# Patient Record
Sex: FEMALE | Race: BLACK OR AFRICAN AMERICAN | NOT HISPANIC OR LATINO | Employment: FULL TIME | URBAN - METROPOLITAN AREA
[De-identification: names, ages, dates, MRNs, and addresses within clinical notes are randomized per-mention and may not be internally consistent; named-entity substitution may affect disease eponyms.]

---

## 2022-03-23 ENCOUNTER — HISTORICAL (OUTPATIENT)
Dept: CARDIOLOGY | Facility: HOSPITAL | Age: 27
End: 2022-03-23

## 2022-03-24 ENCOUNTER — HISTORICAL (OUTPATIENT)
Dept: CARDIOLOGY | Facility: CLINIC | Age: 27
End: 2022-03-24

## 2023-01-31 ENCOUNTER — OFFICE VISIT (OUTPATIENT)
Dept: URGENT CARE | Facility: CLINIC | Age: 28
End: 2023-01-31
Payer: MEDICAID

## 2023-01-31 VITALS
RESPIRATION RATE: 20 BRPM | HEIGHT: 66 IN | HEART RATE: 77 BPM | WEIGHT: 228 LBS | TEMPERATURE: 98 F | BODY MASS INDEX: 36.64 KG/M2 | DIASTOLIC BLOOD PRESSURE: 79 MMHG | OXYGEN SATURATION: 96 % | SYSTOLIC BLOOD PRESSURE: 113 MMHG

## 2023-01-31 DIAGNOSIS — R68.89 FLU-LIKE SYMPTOMS: Primary | ICD-10-CM

## 2023-01-31 DIAGNOSIS — Z11.52 ENCOUNTER FOR SCREENING FOR SEVERE ACUTE RESPIRATORY SYNDROME CORONAVIRUS 2 (SARS-COV-2) INFECTION: ICD-10-CM

## 2023-01-31 PROBLEM — E66.9 OBESITY: Status: ACTIVE | Noted: 2023-01-31

## 2023-01-31 LAB
CTP QC/QA: YES
CTP QC/QA: YES
FLUAV AG UPPER RESP QL IA.RAPID: NOT DETECTED
FLUBV AG UPPER RESP QL IA.RAPID: NOT DETECTED
POC MOLECULAR INFLUENZA A AGN: NEGATIVE
POC MOLECULAR INFLUENZA B AGN: NEGATIVE
RSV A 5' UTR RNA NPH QL NAA+PROBE: NOT DETECTED
SARS-COV-2 RDRP RESP QL NAA+PROBE: NEGATIVE
SARS-COV-2 RNA RESP QL NAA+PROBE: NOT DETECTED

## 2023-01-31 PROCEDURE — 99213 PR OFFICE/OUTPT VISIT, EST, LEVL III, 20-29 MIN: ICD-10-PCS | Mod: S$PBB,,, | Performed by: NURSE PRACTITIONER

## 2023-01-31 PROCEDURE — 99213 OFFICE O/P EST LOW 20 MIN: CPT | Mod: S$PBB,,, | Performed by: NURSE PRACTITIONER

## 2023-01-31 PROCEDURE — 99214 OFFICE O/P EST MOD 30 MIN: CPT | Mod: PBBFAC | Performed by: NURSE PRACTITIONER

## 2023-01-31 PROCEDURE — 0241U COVID/RSV/FLU A&B PCR: CPT | Performed by: NURSE PRACTITIONER

## 2023-01-31 PROCEDURE — 87502 INFLUENZA DNA AMP PROBE: CPT | Mod: PBBFAC | Performed by: NURSE PRACTITIONER

## 2023-01-31 PROCEDURE — 87635 SARS-COV-2 COVID-19 AMP PRB: CPT | Mod: PBBFAC | Performed by: NURSE PRACTITIONER

## 2023-01-31 RX ORDER — ALBUTEROL SULFATE 90 UG/1
2 AEROSOL, METERED RESPIRATORY (INHALATION) EVERY 6 HOURS PRN
Qty: 18 G | Refills: 0 | Status: SHIPPED | OUTPATIENT
Start: 2023-01-31 | End: 2023-06-26

## 2023-01-31 RX ORDER — PROMETHAZINE HYDROCHLORIDE AND DEXTROMETHORPHAN HYDROBROMIDE 6.25; 15 MG/5ML; MG/5ML
5 SYRUP ORAL EVERY 6 HOURS PRN
Qty: 100 ML | Refills: 0 | Status: SHIPPED | OUTPATIENT
Start: 2023-01-31 | End: 2023-02-05

## 2023-01-31 NOTE — PROGRESS NOTES
"Subjective:       Patient ID: Richy Martinez is a 27 y.o. female.    Vitals:  height is 5' 6" (1.676 m) and weight is 103.4 kg (228 lb). Her oral temperature is 97.8 °F (36.6 °C). Her blood pressure is 113/79 and her pulse is 77. Her respiration is 20 and oxygen saturation is 96%.     Chief Complaint: Cough, Nasal Congestion, Shortness of Breath, and Generalized Body Aches (X 3 days)    HPI as stated in CC. Works at [employer], high exposure. NO hx asthma. Excess coughing, chest tightness, painful cough. No fevers.   ROS    Objective:      Physical Exam   Constitutional: She is oriented to person, place, and time.  Non-toxic appearance. She appears ill. No distress. normal  HENT:   Nose: Rhinorrhea and congestion present.   Eyes: Conjunctivae are normal.   Cardiovascular: Normal rate, regular rhythm, normal heart sounds and normal pulses.   Pulmonary/Chest: Effort normal. No respiratory distress. She has wheezes. She has rhonchi. She has rales. She exhibits tenderness.   Neurological: She is alert and oriented to person, place, and time.   Skin: Skin is warm, dry and not diaphoretic.   Psychiatric: Her behavior is normal. Mood, judgment and thought content normal.   Vitals reviewed.      Assessment:       1. Flu-like symptoms    2. Encounter for screening for severe acute respiratory syndrome coronavirus 2 (SARS-CoV-2) infection          Results for orders placed or performed in visit on 01/31/23   POCT COVID-19 Rapid Screening   Result Value Ref Range    POC Rapid COVID Negative Negative     Acceptable Yes    POCT Influenza A/B MOLECULAR   Result Value Ref Range    POC Molecular Influenza A Ag Negative Negative, Not Reported    POC Molecular Influenza B Ag Negative Negative, Not Reported     Acceptable Yes          Plan:         Flu-like symptoms  -     POCT Influenza A/B MOLECULAR  -     COVID/RSV/FLU A&B PCR    Encounter for screening for severe acute respiratory syndrome " coronavirus 2 (SARS-CoV-2) infection  -     POCT COVID-19 Rapid Screening  -     COVID/RSV/FLU A&B PCR    Other orders  -     albuterol (VENTOLIN HFA) 90 mcg/actuation inhaler; Inhale 2 puffs into the lungs every 6 (six) hours as needed for Wheezing or Shortness of Breath.  Dispense: 18 g; Refill: 0  -     promethazine-dextromethorphan (PROMETHAZINE-DM) 6.25-15 mg/5 mL Syrp; Take 5 mLs by mouth every 6 (six) hours as needed (severe cough; reserve for evening/night use, will cause drowsiness. do not take before driving, working, drinking alcohol.).  Dispense: 100 mL; Refill: 0         Please read attached patient education for more information and guidance.     You have 3 tests pending in the hospital lab:  PCR COVID, FLU A/B, RSV    All results will be available today.  Results will post to your [facility] over the next 1-2 days. Please check  your luis felipe frequently for results and messages.   Nurse calls from our clinic can take 1-2 weeks even with abnormal results.     Stay home as long as you are symptomatic.    You can BECOME COVID+ up to 14 days after your last exposure to someone who has COVID. If you go in public and around other people, wear a surgical mask, not a cloth.     Cover your mouth with your shirt or inner elbow when you cough or sneeze.    OTC meds for symptoms, pain, fever, as desired, as directed on package labeling.   **If you are COVID NEGATIVE on your PCR test in lab, we need to Rx you a course of steroids.**

## 2023-01-31 NOTE — PATIENT INSTRUCTIONS
Please read attached patient education for more information and guidance.     You have 3 tests pending in the hospital lab:  PCR COVID, FLU A/B, RSV    All results will be available today.  Results will post to your [facility] over the next 1-2 days. Please check  your luis felipe frequently for results and messages.   Nurse calls from our clinic can take 1-2 weeks even with abnormal results.     Stay home as long as you are symptomatic.    You can BECOME COVID+ up to 14 days after your last exposure to someone who has COVID. If you go in public and around other people, wear a surgical mask, not a cloth.     Cover your mouth with your shirt or inner elbow when you cough or sneeze.    OTC meds for symptoms, pain, fever, as desired, as directed on package labeling.   **If you are COVID NEGATIVE on your PCR test in lab, we need to Rx you a course of steroids.**

## 2023-02-01 ENCOUNTER — TELEPHONE (OUTPATIENT)
Dept: URGENT CARE | Facility: CLINIC | Age: 28
End: 2023-02-01
Payer: MEDICAID

## 2023-06-26 ENCOUNTER — OFFICE VISIT (OUTPATIENT)
Dept: INTERNAL MEDICINE | Facility: CLINIC | Age: 28
End: 2023-06-26
Payer: MEDICAID

## 2023-06-26 VITALS
HEART RATE: 73 BPM | RESPIRATION RATE: 20 BRPM | WEIGHT: 230.19 LBS | BODY MASS INDEX: 36.99 KG/M2 | HEIGHT: 66 IN | DIASTOLIC BLOOD PRESSURE: 83 MMHG | TEMPERATURE: 98 F | SYSTOLIC BLOOD PRESSURE: 117 MMHG

## 2023-06-26 DIAGNOSIS — Z00.00 WELLNESS EXAMINATION: Primary | ICD-10-CM

## 2023-06-26 DIAGNOSIS — Z13.1 SCREENING FOR DIABETES MELLITUS: ICD-10-CM

## 2023-06-26 DIAGNOSIS — Z11.3 ROUTINE SCREENING FOR STI (SEXUALLY TRANSMITTED INFECTION): ICD-10-CM

## 2023-06-26 PROCEDURE — 3074F SYST BP LT 130 MM HG: CPT | Mod: CPTII,,, | Performed by: NURSE PRACTITIONER

## 2023-06-26 PROCEDURE — 1160F PR REVIEW ALL MEDS BY PRESCRIBER/CLIN PHARMACIST DOCUMENTED: ICD-10-PCS | Mod: CPTII,,, | Performed by: NURSE PRACTITIONER

## 2023-06-26 PROCEDURE — 99214 OFFICE O/P EST MOD 30 MIN: CPT | Mod: PBBFAC | Performed by: NURSE PRACTITIONER

## 2023-06-26 PROCEDURE — 1159F PR MEDICATION LIST DOCUMENTED IN MEDICAL RECORD: ICD-10-PCS | Mod: CPTII,,, | Performed by: NURSE PRACTITIONER

## 2023-06-26 PROCEDURE — 99395 PR PREVENTIVE VISIT,EST,18-39: ICD-10-PCS | Mod: S$PBB,,, | Performed by: NURSE PRACTITIONER

## 2023-06-26 PROCEDURE — 99395 PREV VISIT EST AGE 18-39: CPT | Mod: S$PBB,,, | Performed by: NURSE PRACTITIONER

## 2023-06-26 PROCEDURE — 1159F MED LIST DOCD IN RCRD: CPT | Mod: CPTII,,, | Performed by: NURSE PRACTITIONER

## 2023-06-26 PROCEDURE — 3079F PR MOST RECENT DIASTOLIC BLOOD PRESSURE 80-89 MM HG: ICD-10-PCS | Mod: CPTII,,, | Performed by: NURSE PRACTITIONER

## 2023-06-26 PROCEDURE — 1160F RVW MEDS BY RX/DR IN RCRD: CPT | Mod: CPTII,,, | Performed by: NURSE PRACTITIONER

## 2023-06-26 PROCEDURE — 3079F DIAST BP 80-89 MM HG: CPT | Mod: CPTII,,, | Performed by: NURSE PRACTITIONER

## 2023-06-26 PROCEDURE — 3008F BODY MASS INDEX DOCD: CPT | Mod: CPTII,,, | Performed by: NURSE PRACTITIONER

## 2023-06-26 PROCEDURE — 3074F PR MOST RECENT SYSTOLIC BLOOD PRESSURE < 130 MM HG: ICD-10-PCS | Mod: CPTII,,, | Performed by: NURSE PRACTITIONER

## 2023-06-26 PROCEDURE — 3008F PR BODY MASS INDEX (BMI) DOCUMENTED: ICD-10-PCS | Mod: CPTII,,, | Performed by: NURSE PRACTITIONER

## 2023-06-26 NOTE — ASSESSMENT & PLAN NOTE
Enc routine eye and dental exams  RTC 6-8 weeks w/ labs  Will refer to GYN, but patient will look around to get pap done this year and f/u with [facility] for Nexplanon removal this year

## 2023-06-26 NOTE — PROGRESS NOTES
ADRIENNE Melchor   [facility]  [address] [address]      PATIENT NAME: Richy Martinez  : 1995  DATE: 23  MRN: [id number]        Reason for Visit / Chief Complaint: Establish Care       History of Present Illness / Problem Focused Workflow     Richy Martinez presents to the clinic with Establish Care     23: 27 y.o. BF presenting to [facility] to establish primary care.     Previous PCP: None. Moved back from [place] about 5 years ago and did not establish care until now  PmHx: BMI 37, s/p Nexplanon insertion 2.5 years ago  SHx:  x 3, tooth extraction  FHx: Cancer in her maternal grandmother; Epilepsy in her mother; Hypertension in her father; Thyroid cancer in her maternal grandmother.  Complaints today: Establish care    Rcihy is presenting to establish care. Simply wants a check-up. No specific complaints today. She relates living in [place] in the past, but moved back home 5 years ago. Did not establish care until now.     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     She cannot recall last pap. She uses the Nexplanon implant for birth control. This was inserted via the [facility] about 2.5 years ago. Scheduled to be removed 2023.     She does not take any routine medications otherwise.    She has braces and follows an orthodontist.    Otherwise, no acute concerns.       Review of Systems     Review of Systems   Constitutional: Negative.  Negative for activity change, appetite change, chills, diaphoresis, fatigue, fever and unexpected weight change.   HENT: Negative.     Eyes: Negative.  Negative for photophobia, pain, discharge, redness, itching and visual disturbance.   Respiratory: Negative.  Negative for apnea, cough, choking, chest tightness, shortness of breath, wheezing and stridor.    Cardiovascular: Negative.  Negative for chest pain, palpitations and leg  "swelling.   Gastrointestinal: Negative.    Endocrine: Negative.  Negative for cold intolerance, heat intolerance, polydipsia and polyphagia.   Genitourinary: Negative.  Negative for difficulty urinating and dysuria.   Musculoskeletal: Negative.  Negative for arthralgias and back pain.   Skin: Negative.    Allergic/Immunologic: Negative.    Neurological: Negative.    Hematological: Negative.    Psychiatric/Behavioral: Negative.  Negative for agitation, behavioral problems, self-injury, sleep disturbance and suicidal ideas. The patient is not nervous/anxious.      Medical / Social / Family History   History reviewed. No pertinent past medical history.    Past Surgical History:   Procedure Laterality Date     SECTION  /    EXTRACTION OF TOOTH         Social History  Ms.  reports that she has never smoked. She has been exposed to tobacco smoke. She has never used smokeless tobacco. She reports that she does not currently use alcohol. She reports that she does not use drugs.    Family History  Ms.'s family history includes Cancer in her maternal grandmother; Epilepsy in her mother; Hypertension in her father; Thyroid cancer in her maternal grandmother.    Medications and Allergies     Medications  Current Outpatient Medications   Medication Instructions    etonogestreL (NEXPLANON) 68 mg, Subdermal, Once, A single NEXPLANON implant is inserted subdermally just under the skin at the inner side of the non-dominant upper arm.       Allergies  Review of patient's allergies indicates:  No Known Allergies    Physical Examination   Visit Vitals  /83 (BP Location: Left arm, Patient Position: Sitting, BP Method: Medium (Automatic))   Pulse 73   Temp 98.1 °F (36.7 °C) (Oral)   Resp 20   Ht 5' 6" (1.676 m)   Wt 104.4 kg (230 lb 3.2 oz)   BMI 37.16 kg/m²     Physical Exam  Constitutional:       Appearance: Normal appearance. She is obese.   HENT:      Head: Normocephalic and atraumatic.      Right Ear: " Tympanic membrane, ear canal and external ear normal.      Left Ear: Tympanic membrane, ear canal and external ear normal.      Nose: Nose normal.      Mouth/Throat:      Mouth: Mucous membranes are moist.      Pharynx: Oropharynx is clear.      Comments:   +braces  Eyes:      Extraocular Movements: Extraocular movements intact.      Conjunctiva/sclera: Conjunctivae normal.      Pupils: Pupils are equal, round, and reactive to light.   Neck:      Vascular: No carotid bruit.   Cardiovascular:      Rate and Rhythm: Normal rate and regular rhythm.      Pulses: Normal pulses.      Heart sounds: Normal heart sounds.   Pulmonary:      Effort: Pulmonary effort is normal.      Breath sounds: Normal breath sounds.   Abdominal:      General: Bowel sounds are normal.      Palpations: Abdomen is soft.   Musculoskeletal:         General: Normal range of motion.      Cervical back: Normal range of motion.      Right lower leg: No edema.      Left lower leg: No edema.   Skin:     General: Skin is warm and dry.   Neurological:      General: No focal deficit present.      Mental Status: She is alert and oriented to person, place, and time.   Psychiatric:         Mood and Affect: Mood normal.         Behavior: Behavior normal.         Thought Content: Thought content normal.         Judgment: Judgment normal.         Results     Chemistry:  No results found for: NA, K, CHLORIDE, BUN, CREATININE, EGFRNORACEVR, GLUCOSE, CALCIUM, ALKPHOS, LABPROT, ALBUMIN, BILIDIR, IBILI, AST, ALT, MG, PHOS, [id number]     No results found for: HGBA1C, MICROALBCREA     Hematology:  No results found for: WBC, HGB, HCT, PLT    Lipid Panel:  No results found for: CHOL, HDL, LDL, TRIG, TOTALCHOLEST     Urine:  No results found for: COLORUA, APPEARANCEUA, SGUA, PHUA, PROTEINUA, GLUCOSEUA, KETONESUA, BLOODUA, NITRITESUA, LEUKOCYTESUR, RBCUA, WBCUA, BACTERIA, SQEPUA, HYALINECASTS, CREATRANDUR, PROTEINURINE, UPROTCREA       Assessment        ICD-10-CM  ICD-9-CM   1. Wellness examination  Z00.00 V70.0   2. Routine screening for STI (sexually transmitted infection)  Z11.3 V74.5   3. Screening for diabetes mellitus  Z13.1 V77.1   4. BMI 37.0-37.9, adult  Z68.37 V85.37        Plan (including Health Maintenance)     Problem List Items Addressed This Visit          Endocrine    BMI 37.0-37.9, adult    Current Assessment & Plan     Educated on aerobic exercise for 30 mins/day, at least 5 days per week. Low-fat diet              Other    Wellness examination - Primary    Current Assessment & Plan     Enc routine eye and dental exams  RTC 6-8 weeks w/ labs  Will refer to GYN, but patient will look around to get pap done this year and f/u with [facility] for Nexplanon removal this year         Relevant Orders    Ambulatory referral/consult to Gynecology    Urinalysis, Reflex to Urine Culture    TSH    Lipid Panel    Comprehensive Metabolic Panel    CBC Auto Differential    Vitamin D     Other Visit Diagnoses       Routine screening for STI (sexually transmitted infection)        Relevant Orders    SYPHILIS ANTIBODY (WITH REFLEX RPR)    HIV 1/2 Ag/Ab (4th Gen)    Hepatitis Panel, Acute    Chlamydia/GC, PCR    Screening for diabetes mellitus        Relevant Orders    Hemoglobin A1C              Health Maintenance Due   Topic Date Due    Hepatitis C Screening  Never done    Lipid Panel  Never done    HIV Screening  Never done    Pap Smear  Never done    COVID-19 Vaccine (3 - Pfizer series) 08/15/2022       Future Appointments   Date Time Provider Department Center   8/11/2023  8:00 AM ADRIENNE Melchor [facility]        Follow up in about 6 weeks (around 8/7/2023) for Lab Review.    Signature:  ADRIENNE Melchor  [facility]  [address] [address]    Date of encounter: 6/26/23

## 2023-07-05 ENCOUNTER — PATIENT MESSAGE (OUTPATIENT)
Dept: RESEARCH | Facility: HOSPITAL | Age: 28
End: 2023-07-05
Payer: MEDICAID

## 2023-07-24 ENCOUNTER — PATIENT MESSAGE (OUTPATIENT)
Dept: RESEARCH | Facility: HOSPITAL | Age: 28
End: 2023-07-24
Payer: MEDICAID

## 2023-09-25 PROBLEM — Z00.00 WELLNESS EXAMINATION: Status: RESOLVED | Noted: 2023-06-26 | Resolved: 2023-09-25

## 2023-12-09 ENCOUNTER — OFFICE VISIT (OUTPATIENT)
Dept: URGENT CARE | Facility: CLINIC | Age: 28
End: 2023-12-09
Payer: MEDICAID

## 2023-12-09 VITALS
RESPIRATION RATE: 20 BRPM | BODY MASS INDEX: 36.96 KG/M2 | DIASTOLIC BLOOD PRESSURE: 84 MMHG | HEIGHT: 66 IN | OXYGEN SATURATION: 100 % | TEMPERATURE: 98 F | HEART RATE: 91 BPM | WEIGHT: 230 LBS | SYSTOLIC BLOOD PRESSURE: 123 MMHG

## 2023-12-09 DIAGNOSIS — H18.823 CORNEAL ABRASION DUE TO CONTACT LENS, BILATERAL: Primary | ICD-10-CM

## 2023-12-09 DIAGNOSIS — H57.89 REDNESS AND DISCHARGE OF EYE: ICD-10-CM

## 2023-12-09 PROCEDURE — 99203 PR OFFICE/OUTPT VISIT, NEW, LEVL III, 30-44 MIN: ICD-10-PCS | Mod: S$PBB,,,

## 2023-12-09 PROCEDURE — 99214 OFFICE O/P EST MOD 30 MIN: CPT | Mod: PBBFAC

## 2023-12-09 PROCEDURE — 99203 OFFICE O/P NEW LOW 30 MIN: CPT | Mod: S$PBB,,,

## 2023-12-09 RX ORDER — MINERAL OIL AND PETROLATUM 150; 830 MG/G; MG/G
OINTMENT OPHTHALMIC NIGHTLY
Qty: 3.5 G | Refills: 0 | Status: SHIPPED | OUTPATIENT
Start: 2023-12-09

## 2023-12-09 RX ORDER — DEXTROAMPHETAMINE SACCHARATE, AMPHETAMINE ASPARTATE, DEXTROAMPHETAMINE SULFATE AND AMPHETAMINE SULFATE 7.5; 7.5; 7.5; 7.5 MG/1; MG/1; MG/1; MG/1
TABLET ORAL
COMMUNITY
Start: 2023-11-07

## 2023-12-09 RX ORDER — DICLOFENAC SODIUM 1 MG/ML
1 SOLUTION/ DROPS OPHTHALMIC 4 TIMES DAILY
Qty: 2.5 ML | Refills: 0 | Status: SHIPPED | OUTPATIENT
Start: 2023-12-09 | End: 2023-12-12

## 2023-12-09 RX ORDER — CIPROFLOXACIN HYDROCHLORIDE 3 MG/ML
2 SOLUTION/ DROPS OPHTHALMIC DAILY
Qty: 10 ML | Refills: 0 | Status: SHIPPED | OUTPATIENT
Start: 2023-12-09

## 2023-12-09 RX ORDER — TETRACAINE HYDROCHLORIDE 5 MG/ML
1 SOLUTION OPHTHALMIC
Status: DISCONTINUED | OUTPATIENT
Start: 2023-12-09 | End: 2023-12-09

## 2023-12-09 NOTE — LETTER
December 9, 2023      [facility]  [facility] [facility] [address]  Phone: [phone]       Patient: Richy Martinez   YOB: 1995  Date of Visit: 12/09/2023    To Whom It May Concern:    Jimmy Martinez  was at [facility] on 12/09/2023. The patient may return to work/school on 12/11/2023. with no restrictions. If you have any questions or concerns, or if I can be of further assistance, please do not hesitate to contact me.    Sincerely,    ADRIENNE Worthington

## 2023-12-10 NOTE — PROGRESS NOTES
"Subjective:       Patient ID: Richy Martinez is a 28 y.o. female.    Vitals:  height is 5' 6" (1.676 m) and weight is 104.3 kg (230 lb). Her oral temperature is 98.4 °F (36.9 °C). Her blood pressure is 123/84 and her pulse is 91. Her respiration is 20 and oxygen saturation is 100%.     Chief Complaint: Eye Pain (Patient reports having falling asleep with her contacts in. Eyes are red and inflammed.)    29 y/o AA female presents to clinic alone. Reports fell asleep with cosmetic contact lens on last night, presents with string bilateral eye discharge, redness, and photosensitive. States has removed contact lenses since.     Eye Pain   Both eyes are affected. This is a new problem. The current episode started today. The problem occurs constantly. The problem has been gradually worsening. There was no injury mechanism. There is No known exposure to pink eye. She Wears contacts. Associated symptoms include blurred vision, an eye discharge, eye redness, itching and photophobia. Pertinent negatives include no vomiting. She has tried nothing for the symptoms.       Eyes:  Positive for eye discharge, eye itching, eye pain, eye redness, photophobia and blurred vision. Negative for eyelid swelling.   Gastrointestinal:  Negative for vomiting.   Allergic/Immunologic: Negative for seasonal allergies.       Objective:      Physical Exam   Constitutional: She is oriented to person, place, and time. She is cooperative. No distress. awake  HENT:   Head: Normocephalic and atraumatic.   Ears:   Right Ear: Tympanic membrane normal.   Left Ear: Tympanic membrane normal.   Nose: Nose normal.   Mouth/Throat: Mucous membranes are normal.   Eyes: Pupils are equal, round, and reactive to light. Lids are everted and swept, no foreign bodies found. Right eye exhibits discharge. No foreign body present in the right eye. Left eye exhibits discharge. No foreign body present in the left eye. Right conjunctiva is injected. Left conjunctiva " is injected.   Slit lamp exam:       The right eye shows corneal abrasion and fluorescein uptake.        The left eye shows corneal abrasion and fluorescein uptake. Extraocular movement intact vision grossly intact gaze aligned appropriately periorbital hyperpigmentationright eye Sylvia exam positive  left eye Sylvia exam positive  Neck: Neck supple.   Pulmonary/Chest: Effort normal.   Abdominal: Normal appearance.   Neurological: no focal deficit. She is alert and oriented to person, place, and time. GCS eye subscore is 4. GCS verbal subscore is 5. GCS motor subscore is 6.   Skin: Skin is warm, dry and intact.   Psychiatric: Her behavior is normal.   Nursing note and vitals reviewed.        Assessment:       1. Corneal abrasion due to contact lens, bilateral    2. Redness and discharge of eye          Plan:     Will treat for corneal abrasion with Cipro drops. Instructed patient to avoid any extreme lights or prolonged screen time. Also may use refrigerated artifical tears at night to help with dryness. Do not apply contact back in to eyes. If no improvement in five days, follow up with an eye doctor.       Corneal abrasion due to contact lens, bilateral  -     ciprofloxacin HCl (CILOXAN) 0.3 % ophthalmic solution; Place 2 drops into both eyes once daily.  Dispense: 10 mL; Refill: 0  -     diclofenac (VOLTAREN) 0.1 % ophthalmic solution; Place 1 drop into both eyes 4 (four) times daily. for 3 days  Dispense: 2.5 mL; Refill: 0    Redness and discharge of eye  -     Discontinue: TETRAcaine HCL 0.5% ophthalmic solution 1 drop  -     fluorescein ophthalmic strip 2 each

## 2024-06-03 ENCOUNTER — OFFICE VISIT (OUTPATIENT)
Dept: INTERNAL MEDICINE | Facility: CLINIC | Age: 29
End: 2024-06-03
Payer: MEDICAID

## 2024-06-03 VITALS
TEMPERATURE: 98 F | WEIGHT: 230.81 LBS | RESPIRATION RATE: 18 BRPM | HEIGHT: 66 IN | BODY MASS INDEX: 37.09 KG/M2 | SYSTOLIC BLOOD PRESSURE: 112 MMHG | DIASTOLIC BLOOD PRESSURE: 78 MMHG | HEART RATE: 73 BPM

## 2024-06-03 DIAGNOSIS — Z00.00 WELLNESS EXAMINATION: Primary | ICD-10-CM

## 2024-06-03 DIAGNOSIS — Z11.1 SCREENING FOR TUBERCULOSIS: ICD-10-CM

## 2024-06-03 DIAGNOSIS — Z01.84 IMMUNITY STATUS TESTING: ICD-10-CM

## 2024-06-03 DIAGNOSIS — Z11.3 ROUTINE SCREENING FOR STI (SEXUALLY TRANSMITTED INFECTION): ICD-10-CM

## 2024-06-03 PROCEDURE — 1160F RVW MEDS BY RX/DR IN RCRD: CPT | Mod: CPTII,,, | Performed by: NURSE PRACTITIONER

## 2024-06-03 PROCEDURE — 99214 OFFICE O/P EST MOD 30 MIN: CPT | Mod: PBBFAC | Performed by: NURSE PRACTITIONER

## 2024-06-03 PROCEDURE — 99395 PREV VISIT EST AGE 18-39: CPT | Mod: S$PBB,,, | Performed by: NURSE PRACTITIONER

## 2024-06-03 PROCEDURE — 3078F DIAST BP <80 MM HG: CPT | Mod: CPTII,,, | Performed by: NURSE PRACTITIONER

## 2024-06-03 PROCEDURE — 3074F SYST BP LT 130 MM HG: CPT | Mod: CPTII,,, | Performed by: NURSE PRACTITIONER

## 2024-06-03 PROCEDURE — 3008F BODY MASS INDEX DOCD: CPT | Mod: CPTII,,, | Performed by: NURSE PRACTITIONER

## 2024-06-03 PROCEDURE — 1159F MED LIST DOCD IN RCRD: CPT | Mod: CPTII,,, | Performed by: NURSE PRACTITIONER

## 2024-06-03 NOTE — PROGRESS NOTES
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 female here today for follow up with PCP for school physical.     Health Maintenance         Date Due Completion Date    Hepatitis C Screening Never done ---    Cervical Cancer Screening Never done ---    Lipid Panel Never done ---    HIV Screening Never done ---    Influenza Vaccine (Season Ended) 2024 10/2/2022    TETANUS VACCINE 2026 3/26/2016            History reviewed. No pertinent past medical history.     Past Surgical History:   Procedure Laterality Date     SECTION      EXTRACTION OF TOOTH          Social History     Tobacco Use    Smoking status: Never     Passive exposure: Current    Smokeless tobacco: Never   Substance and Sexual Activity    Alcohol use: Not Currently     Comment: social    Drug use: Never    Sexual activity: Yes     Partners: Male     Birth control/protection: Condom, Implant     Comment: Nexplanon        Current Outpatient Medications   Medication Instructions    ciprofloxacin HCl (CILOXAN) 0.3 % ophthalmic solution 2 drops, Both Eyes, Daily    dextroamphetamine-amphetamine 30 mg Tab Oral    etonogestreL (NEXPLANON) 68 mg, Subdermal, Once, A single NEXPLANON implant is inserted subdermally just under the skin at the inner side of the non-dominant upper arm.    white petrolatum-mineral oiL (ARTIFICIAL TEARS, NGUYEN/MIN,) 83-15 % Oint Both Eyes, Nightly       Review of patient's allergies indicates:  No Known Allergies     Patient Care Team:  Ravin Cazares FNP as PCP - General (Family Medicine)     Subjective:     Review of Systems   Constitutional:  Negative for appetite change, chills, diaphoresis and fever.   HENT:  Negative for ear pain, sinus pain and sore throat.    Eyes:  Negative for pain and visual disturbance.   Respiratory:  Negative for cough,  "shortness of breath and wheezing.    Cardiovascular:  Negative for chest pain, palpitations and leg swelling.   Gastrointestinal:  Negative for abdominal pain, blood in stool, diarrhea, nausea and vomiting.   Endocrine: Negative for cold intolerance.   Genitourinary:  Negative for difficulty urinating, dysuria, frequency and hematuria.   Musculoskeletal:  Negative for arthralgias, joint swelling and myalgias.   Skin:  Negative for color change and rash.   Allergic/Immunologic: Negative.    Neurological: Negative.  Negative for dizziness, syncope, light-headedness and numbness.   Hematological: Negative.    Psychiatric/Behavioral: Negative.  Negative for dysphoric mood and suicidal ideas. The patient is not nervous/anxious.    All other systems reviewed and are negative.      12 point review of systems conducted, negative except as stated in the history of present illness. See HPI for details.    Objective:     Visit Vitals  /78 (BP Location: Left arm, Patient Position: Sitting, BP Method: Large (Automatic))   Pulse 73   Temp 98.1 °F (36.7 °C) (Oral)   Resp 18   Ht 5' 5.98" (1.676 m)   Wt 104.7 kg (230 lb 12.8 oz)   BMI 37.27 kg/m²       Physical Exam  Vitals and nursing note reviewed.   Constitutional:       General: She is not in acute distress.     Appearance: She is not ill-appearing.   HENT:      Head: Normocephalic and atraumatic.      Mouth/Throat:      Mouth: Mucous membranes are moist.      Pharynx: Oropharynx is clear.   Eyes:      General: No scleral icterus.     Extraocular Movements: Extraocular movements intact.      Conjunctiva/sclera: Conjunctivae normal.      Pupils: Pupils are equal, round, and reactive to light.   Neck:      Vascular: No carotid bruit.   Cardiovascular:      Rate and Rhythm: Normal rate and regular rhythm.      Heart sounds: No murmur heard.     No friction rub. No gallop.   Pulmonary:      Effort: Pulmonary effort is normal. No respiratory distress.      Breath sounds: " "Normal breath sounds. No wheezing, rhonchi or rales.   Abdominal:      General: Abdomen is flat. Bowel sounds are normal. There is no distension.      Palpations: Abdomen is soft. There is no mass.      Tenderness: There is no abdominal tenderness.   Musculoskeletal:         General: Normal range of motion.      Cervical back: Normal range of motion and neck supple.   Skin:     General: Skin is warm and dry.   Neurological:      General: No focal deficit present.      Mental Status: She is alert.   Psychiatric:         Mood and Affect: Mood normal.         Labs Reviewed:     Chemistry:  No results found for: "NA", "K", "CHLORIDE", "BUN", "CREATININE", "EGFRNORACEVR", "GLUCOSE", "CALCIUM", "ALKPHOS", "LABPROT", "ALBUMIN", "BILIDIR", "IBILI", "AST", "ALT", "MG", "PHOS", "[id number]", "TSH", "[id number]", "PSA"     No results found for: "HGBA1C", "MICROALBCREA"     Hematology:  No results found for: "WBC", "HGB", "HCT", "PLT"    Lipid Panel:  No results found for: "CHOL", "HDL", "LDL", "TRIG", "TOTALCHOLEST"     Urine:  No results found for: "COLORUA", "APPEARANCEUA", "SGUA", "PHUA", "PROTEINUA", "GLUCOSEUA", "KETONESUA", "BLOODUA", "NITRITESUA", "LEUKOCYTESUR", "RBCUA", "WBCUA", "BACTERIA", "SQEPUA", "HYALINECASTS", "CREATRANDUR", "PROTEINURINE", "UPROTCREA"     Assessment:       ICD-10-CM ICD-9-CM   1. Wellness examination  Z00.00 V70.0   2. Routine screening for STI (sexually transmitted infection)  Z11.3 V74.5   3. Immunity status testing  Z01.84 V72.61   4. Screening for tuberculosis  Z11.1 V74.1        Plan:     1. Wellness examination  -     CBC Auto Differential; Future; Expected date: 06/03/2024  -     Comprehensive Metabolic Panel; Future; Expected date: 06/03/2024  -     Lipid Panel; Future; Expected date: 06/03/2024  -     TSH; Future; Expected date: 06/03/2024  -     Hemoglobin A1C; Future; Expected date: 06/03/2024  -     Urinalysis; Future; Expected date: 06/03/2024  -     HCG Qualitative Urine; " Future; Expected date: 06/03/2024  -     T4, Free; Future; Expected date: 06/03/2024  -     Ambulatory referral/consult to Gynecology; Future; Expected date: 06/10/2024    2. Routine screening for STI (sexually transmitted infection)  -     Chlamydia/GC, PCR; Future; Expected date: 06/03/2024  -     Hepatitis Panel, Acute; Future; Expected date: 06/03/2024  -     HIV 1/2 Ag/Ab (4th Gen); Future; Expected date: 06/03/2024  -     SYPHILIS ANTIBODY (WITH REFLEX RPR); Future; Expected date: 06/03/2024    3. Immunity status testing  -     Rubella antibody, IgG; Future; Expected date: 06/03/2024  -     Rubeola antibody IgG; Future; Expected date: 06/03/2024  -     Hepatitis B Surface Antibody, Qual/Quant; Future; Expected date: 06/03/2024  -     Varicella zoster antibody, IgG; Future; Expected date: 06/03/2024    4. Screening for tuberculosis  -     Quantiferon Gold TB; Future; Expected date: 06/03/2024         Follow up in about 1 year (around 6/3/2025) for follow up. In addition to their scheduled follow up, the patient has also been instructed to follow up on as needed basis.     Future Appointments   Date Time Provider Department Center   6/3/2025  1:20 PM Ravin Cazares FNP [facility]        ADRIENNE Wooten

## 2024-06-05 ENCOUNTER — LAB VISIT (OUTPATIENT)
Dept: LAB | Facility: HOSPITAL | Age: 29
End: 2024-06-05
Attending: NURSE PRACTITIONER
Payer: MEDICAID

## 2024-06-05 DIAGNOSIS — Z11.1 SCREENING FOR TUBERCULOSIS: ICD-10-CM

## 2024-06-05 DIAGNOSIS — Z11.3 ROUTINE SCREENING FOR STI (SEXUALLY TRANSMITTED INFECTION): ICD-10-CM

## 2024-06-05 DIAGNOSIS — Z01.84 IMMUNITY STATUS TESTING: ICD-10-CM

## 2024-06-05 DIAGNOSIS — Z00.00 WELLNESS EXAMINATION: ICD-10-CM

## 2024-06-05 LAB
ALBUMIN SERPL-MCNC: 3.9 G/DL (ref 3.5–5)
ALBUMIN/GLOB SERPL: 1.1 RATIO (ref 1.1–2)
ALP SERPL-CCNC: 71 UNIT/L (ref 40–150)
ALT SERPL-CCNC: 21 UNIT/L (ref 0–55)
ANION GAP SERPL CALC-SCNC: 5 MEQ/L
AST SERPL-CCNC: 25 UNIT/L (ref 5–34)
BASOPHILS # BLD AUTO: 0.05 X10(3)/MCL
BASOPHILS NFR BLD AUTO: 0.5 %
BILIRUB SERPL-MCNC: 0.5 MG/DL
BUN SERPL-MCNC: 6.1 MG/DL (ref 7–18.7)
CALCIUM SERPL-MCNC: 9.7 MG/DL (ref 8.4–10.2)
CHLORIDE SERPL-SCNC: 108 MMOL/L (ref 98–107)
CHOLEST SERPL-MCNC: 197 MG/DL
CHOLEST/HDLC SERPL: 4 {RATIO} (ref 0–5)
CO2 SERPL-SCNC: 25 MMOL/L (ref 22–29)
CREAT SERPL-MCNC: 0.95 MG/DL (ref 0.55–1.02)
CREAT/UREA NIT SERPL: 6
EOSINOPHIL # BLD AUTO: 0.27 X10(3)/MCL (ref 0–0.9)
EOSINOPHIL NFR BLD AUTO: 2.5 %
ERYTHROCYTE [DISTWIDTH] IN BLOOD BY AUTOMATED COUNT: 13.5 % (ref 11.5–17)
EST. AVERAGE GLUCOSE BLD GHB EST-MCNC: 99.7 MG/DL
GFR SERPLBLD CREATININE-BSD FMLA CKD-EPI: >60 ML/MIN/1.73/M2
GLOBULIN SER-MCNC: 3.7 GM/DL (ref 2.4–3.5)
GLUCOSE SERPL-MCNC: 85 MG/DL (ref 74–100)
HAV IGM SERPL QL IA: NONREACTIVE
HBA1C MFR BLD: 5.1 %
HBV CORE IGM SERPL QL IA: NONREACTIVE
HBV SURFACE AG SERPL QL IA: NONREACTIVE
HCT VFR BLD AUTO: 42.1 % (ref 37–47)
HCV AB SERPL QL IA: NONREACTIVE
HDLC SERPL-MCNC: 53 MG/DL (ref 35–60)
HGB BLD-MCNC: 14.5 G/DL (ref 12–16)
HIV 1+2 AB+HIV1 P24 AG SERPL QL IA: NONREACTIVE
IMM GRANULOCYTES # BLD AUTO: 0.02 X10(3)/MCL (ref 0–0.04)
IMM GRANULOCYTES NFR BLD AUTO: 0.2 %
LDLC SERPL CALC-MCNC: 128 MG/DL (ref 50–140)
LYMPHOCYTES # BLD AUTO: 2.87 X10(3)/MCL (ref 0.6–4.6)
LYMPHOCYTES NFR BLD AUTO: 26.5 %
MCH RBC QN AUTO: 31.2 PG (ref 27–31)
MCHC RBC AUTO-ENTMCNC: 34.4 G/DL (ref 33–36)
MCV RBC AUTO: 90.5 FL (ref 80–94)
MONOCYTES # BLD AUTO: 0.83 X10(3)/MCL (ref 0.1–1.3)
MONOCYTES NFR BLD AUTO: 7.7 %
NEUTROPHILS # BLD AUTO: 6.79 X10(3)/MCL (ref 2.1–9.2)
NEUTROPHILS NFR BLD AUTO: 62.6 %
NRBC BLD AUTO-RTO: 0 %
PLATELET # BLD AUTO: 224 X10(3)/MCL (ref 130–400)
PMV BLD AUTO: 10.9 FL (ref 7.4–10.4)
POTASSIUM SERPL-SCNC: 3.4 MMOL/L (ref 3.5–5.1)
PROT SERPL-MCNC: 7.6 GM/DL (ref 6.4–8.3)
RBC # BLD AUTO: 4.65 X10(6)/MCL (ref 4.2–5.4)
SODIUM SERPL-SCNC: 138 MMOL/L (ref 136–145)
T PALLIDUM AB SER QL: NONREACTIVE
T4 FREE SERPL-MCNC: 0.86 NG/DL (ref 0.7–1.48)
TRIGL SERPL-MCNC: 80 MG/DL (ref 37–140)
TSH SERPL-ACNC: 3.1 UIU/ML (ref 0.35–4.94)
VLDLC SERPL CALC-MCNC: 16 MG/DL
WBC # SPEC AUTO: 10.83 X10(3)/MCL (ref 4.5–11.5)

## 2024-06-05 PROCEDURE — 80053 COMPREHEN METABOLIC PANEL: CPT

## 2024-06-05 PROCEDURE — 83036 HEMOGLOBIN GLYCOSYLATED A1C: CPT

## 2024-06-05 PROCEDURE — 86762 RUBELLA ANTIBODY: CPT

## 2024-06-05 PROCEDURE — 86765 RUBEOLA ANTIBODY: CPT

## 2024-06-05 PROCEDURE — 86480 TB TEST CELL IMMUN MEASURE: CPT

## 2024-06-05 PROCEDURE — 86780 TREPONEMA PALLIDUM: CPT

## 2024-06-05 PROCEDURE — 87389 HIV-1 AG W/HIV-1&-2 AB AG IA: CPT

## 2024-06-05 PROCEDURE — 36415 COLL VENOUS BLD VENIPUNCTURE: CPT

## 2024-06-05 PROCEDURE — 86706 HEP B SURFACE ANTIBODY: CPT

## 2024-06-05 PROCEDURE — 85025 COMPLETE CBC W/AUTO DIFF WBC: CPT

## 2024-06-05 PROCEDURE — 86787 VARICELLA-ZOSTER ANTIBODY: CPT

## 2024-06-05 PROCEDURE — 80074 ACUTE HEPATITIS PANEL: CPT

## 2024-06-05 PROCEDURE — 84439 ASSAY OF FREE THYROXINE: CPT

## 2024-06-05 PROCEDURE — 80061 LIPID PANEL: CPT

## 2024-06-05 PROCEDURE — 84443 ASSAY THYROID STIM HORMONE: CPT

## 2024-06-06 LAB
HBV SURFACE AB SER QL IA: NEGATIVE
HBV SURFACE AB SERPL IA-ACNC: <3.5 MIU/ML
MEV IGG SER IA-ACNC: 2.5
MEV IGG SER QL IA: POSITIVE
RUBV IGG SERPL IA-ACNC: 0.9
RUBV IGG SERPL QL IA: NORMAL
VZV IGG SER IA-ACNC: 4.1
VZV IGG SER QL IA: POSITIVE

## 2024-06-07 LAB
GAMMA INTERFERON BACKGROUND BLD IA-ACNC: 0.04 IU/ML
M TB IFN-G BLD-IMP: NEGATIVE
M TB IFN-G CD4+ BCKGRND COR BLD-ACNC: 0.07 IU/ML
M TB IFN-G CD4+CD8+ BCKGRND COR BLD-ACNC: 0.04 IU/ML
MITOGEN IGNF BCKGRD COR BLD-ACNC: 9.96 IU/ML

## 2024-06-10 ENCOUNTER — LAB VISIT (OUTPATIENT)
Dept: LAB | Facility: HOSPITAL | Age: 29
End: 2024-06-10
Attending: NURSE PRACTITIONER
Payer: MEDICAID

## 2024-06-10 ENCOUNTER — PATIENT MESSAGE (OUTPATIENT)
Dept: INTERNAL MEDICINE | Facility: CLINIC | Age: 29
End: 2024-06-10
Payer: MEDICAID

## 2024-06-10 DIAGNOSIS — Z00.00 WELLNESS EXAMINATION: ICD-10-CM

## 2024-06-10 DIAGNOSIS — Z11.3 ROUTINE SCREENING FOR STI (SEXUALLY TRANSMITTED INFECTION): ICD-10-CM

## 2024-06-10 LAB
B-HCG UR QL: NEGATIVE
C TRACH DNA SPEC QL NAA+PROBE: NOT DETECTED
N GONORRHOEA DNA SPEC QL NAA+PROBE: NOT DETECTED
SOURCE (OHS): NORMAL

## 2024-06-10 PROCEDURE — 87491 CHLMYD TRACH DNA AMP PROBE: CPT

## 2024-06-10 PROCEDURE — 87591 N.GONORRHOEAE DNA AMP PROB: CPT

## 2024-06-10 PROCEDURE — 81025 URINE PREGNANCY TEST: CPT

## 2024-06-24 ENCOUNTER — PATIENT MESSAGE (OUTPATIENT)
Dept: INTERNAL MEDICINE | Facility: CLINIC | Age: 29
End: 2024-06-24
Payer: MEDICAID